# Patient Record
Sex: FEMALE | Race: WHITE | NOT HISPANIC OR LATINO
[De-identification: names, ages, dates, MRNs, and addresses within clinical notes are randomized per-mention and may not be internally consistent; named-entity substitution may affect disease eponyms.]

---

## 2017-03-08 ENCOUNTER — APPOINTMENT (OUTPATIENT)
Dept: OBGYN | Facility: CLINIC | Age: 62
End: 2017-03-08

## 2017-03-08 VITALS
DIASTOLIC BLOOD PRESSURE: 82 MMHG | BODY MASS INDEX: 43.95 KG/M2 | WEIGHT: 280 LBS | HEIGHT: 67 IN | SYSTOLIC BLOOD PRESSURE: 125 MMHG

## 2017-03-10 ENCOUNTER — MESSAGE (OUTPATIENT)
Age: 62
End: 2017-03-10

## 2017-03-10 LAB — HPV HIGH+LOW RISK DNA PNL CVX: NEGATIVE

## 2017-03-13 LAB — CYTOLOGY CVX/VAG DOC THIN PREP: NORMAL

## 2018-03-14 ENCOUNTER — APPOINTMENT (OUTPATIENT)
Dept: OBGYN | Facility: CLINIC | Age: 63
End: 2018-03-14
Payer: COMMERCIAL

## 2018-03-14 VITALS
BODY MASS INDEX: 43.79 KG/M2 | SYSTOLIC BLOOD PRESSURE: 134 MMHG | WEIGHT: 279 LBS | DIASTOLIC BLOOD PRESSURE: 80 MMHG | HEIGHT: 67 IN

## 2018-03-14 PROCEDURE — 82270 OCCULT BLOOD FECES: CPT

## 2018-03-14 PROCEDURE — 99396 PREV VISIT EST AGE 40-64: CPT

## 2018-03-17 LAB
CYTOLOGY CVX/VAG DOC THIN PREP: NORMAL
HPV HIGH+LOW RISK DNA PNL CVX: NOT DETECTED

## 2019-03-18 ENCOUNTER — APPOINTMENT (OUTPATIENT)
Dept: OBGYN | Facility: CLINIC | Age: 64
End: 2019-03-18
Payer: COMMERCIAL

## 2019-03-18 VITALS
HEIGHT: 67 IN | BODY MASS INDEX: 42.53 KG/M2 | SYSTOLIC BLOOD PRESSURE: 133 MMHG | WEIGHT: 271 LBS | DIASTOLIC BLOOD PRESSURE: 74 MMHG

## 2019-03-18 DIAGNOSIS — Z01.419 ENCOUNTER FOR GYNECOLOGICAL EXAMINATION (GENERAL) (ROUTINE) W/OUT ABNORMAL FINDINGS: ICD-10-CM

## 2019-03-18 DIAGNOSIS — Z12.12 ENCOUNTER FOR SCREENING FOR MALIGNANT NEOPLASM OF COLON: ICD-10-CM

## 2019-03-18 DIAGNOSIS — Z12.11 ENCOUNTER FOR SCREENING FOR MALIGNANT NEOPLASM OF COLON: ICD-10-CM

## 2019-03-18 PROCEDURE — 99396 PREV VISIT EST AGE 40-64: CPT

## 2019-03-18 PROCEDURE — 82270 OCCULT BLOOD FECES: CPT

## 2019-03-18 NOTE — PHYSICAL EXAM
[Awake] : awake [Acute Distress] : no acute distress [Alert] : alert [Goiter] : no goiter [Thyroid Nodule] : no thyroid nodule [Mass] : no breast mass [Nipple Discharge] : no nipple discharge [Axillary LAD] : no axillary lymphadenopathy [Soft] : soft [Tender] : non tender [Oriented x3] : oriented to person, place, and time [Normal] : uterus [No Bleeding] : there was no active vaginal bleeding [Uterine Adnexae] : were not tender and not enlarged [No Tenderness] : no rectal tenderness [Nl Sphincter Tone] : normal sphincter tone [External Hemorrhoid] : an external hemorrhoid [RRR, No Murmurs] : RRR, no murmurs [CTAB] : CTAB

## 2019-03-20 ENCOUNTER — MESSAGE (OUTPATIENT)
Age: 64
End: 2019-03-20

## 2019-03-20 LAB — HPV HIGH+LOW RISK DNA PNL CVX: NOT DETECTED

## 2019-03-21 ENCOUNTER — MESSAGE (OUTPATIENT)
Age: 64
End: 2019-03-21

## 2019-03-21 LAB — CYTOLOGY CVX/VAG DOC THIN PREP: NORMAL

## 2020-05-26 ENCOUNTER — APPOINTMENT (OUTPATIENT)
Dept: OBGYN | Facility: CLINIC | Age: 65
End: 2020-05-26